# Patient Record
Sex: MALE | Race: BLACK OR AFRICAN AMERICAN | NOT HISPANIC OR LATINO | Employment: FULL TIME | URBAN - METROPOLITAN AREA
[De-identification: names, ages, dates, MRNs, and addresses within clinical notes are randomized per-mention and may not be internally consistent; named-entity substitution may affect disease eponyms.]

---

## 2017-09-16 ENCOUNTER — OFFICE VISIT (OUTPATIENT)
Dept: URGENT CARE | Facility: URGENT CARE | Age: 32
End: 2017-09-16
Payer: COMMERCIAL

## 2017-09-16 VITALS
DIASTOLIC BLOOD PRESSURE: 76 MMHG | TEMPERATURE: 98.6 F | SYSTOLIC BLOOD PRESSURE: 123 MMHG | WEIGHT: 183.5 LBS | HEART RATE: 66 BPM

## 2017-09-16 DIAGNOSIS — S39.012A BACK STRAIN, INITIAL ENCOUNTER: Primary | ICD-10-CM

## 2017-09-16 PROCEDURE — 99203 OFFICE O/P NEW LOW 30 MIN: CPT | Performed by: FAMILY MEDICINE

## 2017-09-16 RX ORDER — IBUPROFEN 600 MG/1
600 TABLET, FILM COATED ORAL EVERY 6 HOURS PRN
Qty: 90 TABLET | Refills: 1 | Status: SHIPPED | OUTPATIENT
Start: 2017-09-16 | End: 2019-09-06

## 2017-09-16 NOTE — PROGRESS NOTES
Subjective:   Reuben Cano is a 32 year old male who presents for   Chief Complaint   Patient presents with     Back Pain     lower back pain from lifting heavy weights two days ago.      Did dead lifts the other day and injured himself, attempted 300 lbs. Used to play basketball and rugby in college. Has tried advil can't recall the strength. The symptoms at this time is mainly tightness. He's done this before and it took over a week to heal. Normal bowel/bladder dysfunc. No radicular or symptoms going down to legs.     PMHX/PSHX/MEDS/ALLERGIES/SHX/FHX reviewed and updated in Epic.    There are no active problems to display for this patient.    Current Outpatient Prescriptions   Medication     ibuprofen (ADVIL/MOTRIN) 600 MG tablet     No current facility-administered medications for this visit.        ROS:  As above per HPI    Objective:   /76  Pulse 66  Temp 98.6  F (37  C) (Oral)  Wt 183 lb 8 oz (83.2 kg), There is no height or weight on file to calculate BMI.  Gen:  NAD, well-nourished, sitting in chair comfortably, very pleasant  HEENT: PERRLA; nares patent; oropharynx pink and moist  Neck: s trachea midline  BACK: good ROM in flexion and extension. There is mild discomfort midline above the pelvis in the L3/L4 region. Negative straight leg test.   NEURO: 1+ patellar bilaterally, symmetrical  ABD: non distended, soft  Skin: no obvious rashes or abnormalities  Psych: Euthymic, linear thoughts, normal rate of speech       No results found for this or any previous visit.    Assesment & Plan:   Reuben Cano, 32 year old male who presents with:  Back strain, initial encounter  Stretches, avoiding deadlifting until improved, Ibuprofen PRN. Patient declined muscle relaxants. I encouraged to also use heat packs  - ibuprofen (ADVIL/MOTRIN) 600 MG tablet  Dispense: 90 tablet; Refill: 1    Options for treatment and/or follow-up care were reviewed with the patient. Reuben Cano and/or legal guardian was  engaged and actively involved in the decision making process. Patient/guardian verbalized understanding of the options discussed and was satisfied with the final plan.      Gary Tinoco MD PGY3  [phone] (Pager)  [facility]

## 2017-09-16 NOTE — NURSING NOTE
Chief Complaint   Patient presents with     Back Pain     lower back pain from lifting heavy weights two days ago.        Initial /76  Pulse 66  Temp 98.6  F (37  C) (Oral)  Wt 183 lb 8 oz (83.2 kg) There is no height or weight on file to calculate BMI.  Medication Reconciliation: complete

## 2017-09-16 NOTE — MR AVS SNAPSHOT
"              After Visit Summary   2017    Reuben Cano    MRN: [id number]           Patient Information     Date Of Birth          1985        Visit Information        Provider Department      2017 12:40 PM Gary Tinoco MD [facility]        Today's Diagnoses     Back strain, initial encounter    -  1      Care Instructions    Try heat packs to the lower back    Ibuprofen 600mg as needed for pain     Perform back stretches daily          Follow-ups after your visit        Who to contact     If you have questions or need follow up information about today's clinic visit or your schedule please contact [facility] directly at [phone].  Normal or non-critical lab and imaging results will be communicated to you by MyChart, letter or phone within 4 business days after the clinic has received the results. If you do not hear from us within 7 days, please contact the clinic through Yesweplayhart or phone. If you have a critical or abnormal lab result, we will notify you by phone as soon as possible.  Submit refill requests through JRapid or call your pharmacy and they will forward the refill request to us. Please allow 3 business days for your refill to be completed.          Additional Information About Your Visit        MyChart Information     JRapid lets you send messages to your doctor, view your test results, renew your prescriptions, schedule appointments and more. To sign up, go to [email or web address] . Click on \"Log in\" on the left side of the screen, which will take you to the Welcome page. Then click on \"Sign up Now\" on the right side of the page.     You will be asked to enter the access code listed below, as well as some personal information. Please follow the directions to create your username and password.     Your access code is: [email or web address]  Expires: 12/15/2017  1:11 PM     Your access code will  in 90 days. If " you need help or a new code, please call your [facility] clinic or [phone].        Care EveryWhere ID     This is your Care EveryWhere ID. This could be used by other organizations to access your [facility] medical records  [id number]        Your Vitals Were     Pulse Temperature                66 98.6  F (37  C) (Oral)           Blood Pressure from Last 3 Encounters:   09/16/17 123/76    Weight from Last 3 Encounters:   09/16/17 183 lb 8 oz (83.2 kg)              Today, you had the following     No orders found for display         Today's Medication Changes          These changes are accurate as of: 9/16/17  1:11 PM.  If you have any questions, ask your nurse or doctor.               Start taking these medicines.        Dose/Directions    ibuprofen 600 MG tablet   Commonly known as:  ADVIL/MOTRIN   Used for:  Back strain, initial encounter   Started by:  Gary Tinoco MD        Dose:  600 mg   Take 1 tablet (600 mg) by mouth every 6 hours as needed for moderate pain   Quantity:  90 tablet   Refills:  1            Where to get your medicines      These medications were sent to [facility] [address] [place] [address] [address] [facility] [address]  [address], [facility] [address]    Hours:  24-hours Phone:  [phone]     ibuprofen 600 MG tablet                Primary Care Provider    None Specified       No primary provider on file.        Equal Access to Services     LC MOORE AH: Hadii jesus caponeo Sovicky, waaxda luqadaha, qaybta kaalmada nyasia, adilia page. So [facility] [place] [facility] [phone].    ATENCIÓN: Si habla español, tiene a knight disposición servicios gratuitos de asistencia lingüística. [place]lower al [phone].    We comply with applicable federal civil rights laws and [place] laws. We do not discriminate on the basis of race, color, national origin, age, disability sex, sexual orientation or gender identity.            Thank you!      Thank you for choosing [facility]  for your care. Our goal is always to provide you with excellent care. Hearing back from our patients is one way we can continue to improve our services. Please take a few minutes to complete the written survey that you may receive in the mail after your visit with us. Thank you!             Your Updated Medication List - Protect others around you: Learn how to safely use, store and throw away your medicines at www.disposemymeds.org.          This list is accurate as of: 9/16/17  1:11 PM.  Always use your most recent med list.                   Brand Name Dispense Instructions for use Diagnosis    ibuprofen 600 MG tablet    ADVIL/MOTRIN    90 tablet    Take 1 tablet (600 mg) by mouth every 6 hours as needed for moderate pain    Back strain, initial encounter

## 2017-09-16 NOTE — PATIENT INSTRUCTIONS
Try heat packs to the lower back    Ibuprofen 600mg as needed for pain     Perform back stretches daily

## 2018-12-21 ENCOUNTER — ANCILLARY PROCEDURE (OUTPATIENT)
Dept: GENERAL RADIOLOGY | Facility: CLINIC | Age: 33
End: 2018-12-21
Attending: PHYSICIAN ASSISTANT
Payer: COMMERCIAL

## 2018-12-21 ENCOUNTER — OFFICE VISIT (OUTPATIENT)
Dept: URGENT CARE | Facility: URGENT CARE | Age: 33
End: 2018-12-21
Payer: COMMERCIAL

## 2018-12-21 VITALS — OXYGEN SATURATION: 99 % | WEIGHT: 188.7 LBS | TEMPERATURE: 97.1 F | HEART RATE: 60 BPM

## 2018-12-21 DIAGNOSIS — M62.830 BACK MUSCLE SPASM: ICD-10-CM

## 2018-12-21 DIAGNOSIS — S39.92XA LOWER BACK INJURY, INITIAL ENCOUNTER: Primary | ICD-10-CM

## 2018-12-21 DIAGNOSIS — S39.92XA LOWER BACK INJURY, INITIAL ENCOUNTER: ICD-10-CM

## 2018-12-21 PROCEDURE — 72100 X-RAY EXAM L-S SPINE 2/3 VWS: CPT | Mod: FY

## 2018-12-21 PROCEDURE — 99214 OFFICE O/P EST MOD 30 MIN: CPT | Performed by: PHYSICIAN ASSISTANT

## 2018-12-21 RX ORDER — IBUPROFEN 800 MG/1
800 TABLET, FILM COATED ORAL EVERY 8 HOURS PRN
Qty: 30 TABLET | Refills: 0 | Status: SHIPPED | OUTPATIENT
Start: 2018-12-21 | End: 2019-09-06

## 2018-12-21 RX ORDER — METHOCARBAMOL 750 MG/1
750 TABLET, FILM COATED ORAL 4 TIMES DAILY PRN
Qty: 30 TABLET | Refills: 0 | Status: SHIPPED | OUTPATIENT
Start: 2018-12-21 | End: 2018-12-31

## 2018-12-24 NOTE — PROGRESS NOTES
SUBJECTIVE  HPI: Reuben Cano is a 33 year old male who presents for evaluation of back pain and spams  Symptoms began 2 day(s) ago, have been onset gradual and are worse.  Pain is located in the low back bilateral region, with radiation to does not radiate, and are at worst a 4 on a scale of 1-10.  Recent injury:turning/bending   Personal hx of back pain is recurrent self limited episodes of low back pain in the past.  Pain is exacerbated by: bending and changing position.  Pain is relieved by: heat and ice   Associated sx include: denies radicular pain  Red flag symptoms: negative, fever, chills, night sweats.    PMH  Lower back pain    FAMILY HX  HTN  Allergies  Obesity  .  No Known Allergies  Social History     Tobacco Use     Smoking status: Never Smoker     Smokeless tobacco: Never Used   Substance Use Topics     Alcohol use: Not on file       ROS:  CONSTITUTIONAL:NEGATIVE for fever, chills, change in weight  INTEGUMENTARY/SKIN: NEGATIVE for worrisome rashes, moles or lesions  ENT/MOUTH: NEGATIVE for ear, mouth and throat problems  RESP:NEGATIVE for significant cough or SOB  CV: NEGATIVE for chest pain, palpitations or peripheral edema  GI: NEGATIVE for nausea, abdominal pain, heartburn, or change in bowel habits  : positive for dysuria  MUSCULOSKELETAL: POSITIVE  for back pain bilateral with spasms  NEURO: NEGATIVE for weakness, dizziness or paresthesias    OBJECTIVE:  Pulse 60   Temp 97.1  F (36.2  C) (Tympanic)   Wt 85.6 kg (188 lb 11.2 oz)   SpO2 99%   Back examination: Back symmetric, no curvature. ROM normal. No CVA tenderness., positive findings: paraspinal muscle spasm, tenderness to palpation   STRAIGHT leg raise test: negative  GENERAL APPEARANCE: healthy, alert and no distress  NECK: supple, non-tender to palpation, no adenopathy noted  RESP: lungs clear to auscultation - no rales, rhonchi or wheezes  CV: regular rates and rhythm, normal S1 S2, no murmur noted  ABDOMEN:  soft, nontender, no  HSM or masses and bowel sounds normal  NEURO: Normal strength and tone with no weakness or sensory deficit noted, reflexes normal   Extremities: no peripheral edema or tenderness, peripheral pulses normal  MS:  tender to palpation lumbar area with spasms  SKIN: no suspicious lesions or rashes    Lumbar xray Negative for acute findings, read by Balta MARAVILLA at time of visit.    ASSESSMENT/IMPRESSION/PLAN      ICD-10-CM    1. Lower back injury, initial encounter S39.92XA XR Lumbar Spine 2/3 Views     ibuprofen (ADVIL/MOTRIN) 800 MG tablet   2. Back muscle spasm M62.830 methocarbamol (ROBAXIN) 750 MG tablet       EDUCATION      1.  Continue stretching and strengthening exercises.       2.  Continue prn heat or ice application.    Orders Placed This Encounter     XR Lumbar Spine 2/3 Views     ibuprofen (ADVIL/MOTRIN) 800 MG tablet     methocarbamol (ROBAXIN) 750 MG tablet     Advised follow up with PCP for recheck  If symptoms worsen then return to the clinic or go to the ED

## 2019-09-06 ENCOUNTER — OFFICE VISIT (OUTPATIENT)
Dept: INTERNAL MEDICINE | Facility: CLINIC | Age: 34
End: 2019-09-06
Payer: COMMERCIAL

## 2019-09-06 VITALS
DIASTOLIC BLOOD PRESSURE: 76 MMHG | WEIGHT: 179 LBS | RESPIRATION RATE: 14 BRPM | HEIGHT: 69 IN | OXYGEN SATURATION: 100 % | HEART RATE: 52 BPM | TEMPERATURE: 98.1 F | SYSTOLIC BLOOD PRESSURE: 110 MMHG | BODY MASS INDEX: 26.51 KG/M2

## 2019-09-06 DIAGNOSIS — Z00.00 ROUTINE GENERAL MEDICAL EXAMINATION AT A HEALTH CARE FACILITY: Primary | ICD-10-CM

## 2019-09-06 DIAGNOSIS — Z13.6 CARDIOVASCULAR SCREENING; LDL GOAL LESS THAN 160: ICD-10-CM

## 2019-09-06 LAB
CHOLEST SERPL-MCNC: 251 MG/DL
HDLC SERPL-MCNC: 54 MG/DL
LDLC SERPL CALC-MCNC: 174 MG/DL
NONHDLC SERPL-MCNC: 197 MG/DL
TRIGL SERPL-MCNC: 115 MG/DL

## 2019-09-06 PROCEDURE — 99395 PREV VISIT EST AGE 18-39: CPT | Performed by: INTERNAL MEDICINE

## 2019-09-06 PROCEDURE — 36415 COLL VENOUS BLD VENIPUNCTURE: CPT | Performed by: INTERNAL MEDICINE

## 2019-09-06 PROCEDURE — 80061 LIPID PANEL: CPT | Performed by: INTERNAL MEDICINE

## 2019-09-06 ASSESSMENT — MIFFLIN-ST. JEOR: SCORE: 1747.32

## 2019-09-06 NOTE — PROGRESS NOTES
SUBJECTIVE:   CC: Reuben Cano is an 34 year old male who presents for preventative health visit.     Healthy Habits:     Getting at least 3 servings of Calcium per day:  Yes    Bi-annual eye exam:  Yes    Dental care twice a year:  NO    Sleep apnea or symptoms of sleep apnea:  None    Diet:  Regular (no restrictions)    Frequency of exercise:  2-3 days/week    Duration of exercise:  Greater than 60 minutes    Taking medications regularly:  Yes    Barriers to taking medications:  None    Medication side effects:  None    PHQ-2 Total Score: 0    Additional concerns today:  No        1.  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    He was in the [employer], had many vaccinations during his training, did not remember what he received or when he received it.    Today's PHQ-2 Score:   PHQ-2 ( 1999 Pfizer) 9/6/2019   Q1: Little interest or pleasure in doing things 0   Q2: Feeling down, depressed or hopeless 0   PHQ-2 Score 0   Q1: Little interest or pleasure in doing things Not at all   Q2: Feeling down, depressed or hopeless Not at all   PHQ-2 Score 0       Abuse: Current or Past(Physical, Sexual or Emotional)- No  Do you feel safe in your environment? Yes    Social History     Tobacco Use     Smoking status: Never Smoker     Smokeless tobacco: Never Used   Substance Use Topics     Alcohol use: Not on file         Alcohol Use 9/6/2019   Prescreen: >3 drinks/day or >7 drinks/week? No       Last PSA: No results found for: PSA    Reviewed orders with patient. Reviewed health maintenance and updated orders accordingly - Yes      Reviewed and updated as needed this visit by clinical staff  Tobacco  Allergies  Meds  Problems  Med Hx  Surg Hx  Fam Hx  Soc Hx          Reviewed and updated as needed this visit by Provider  Tobacco  Allergies  Meds  Problems  Med Hx  Surg Hx  Fam Hx          Past Medical History:  ---------------------------  Past Medical History:   Diagnosis Date      NO ACTIVE PROBLEMS        Past Surgical History:  ---------------------------  Past Surgical History:   Procedure Laterality Date     NO HISTORY OF SURGERY         Current Medications:  ---------------------------  No current outpatient medications on file.       Allergies:  -------------  No Known Allergies    Social History:  -------------------  Social History     Socioeconomic History     Marital status:      Spouse name: Not on file     Number of children: Not on file     Years of education: Not on file     Highest education level: Not on file   Occupational History     Not on file   Social Needs     Financial resource strain: Not on file     Food insecurity:     Worry: Not on file     Inability: Not on file     Transportation needs:     Medical: Not on file     Non-medical: Not on file   Tobacco Use     Smoking status: Never Smoker     Smokeless tobacco: Never Used   Substance and Sexual Activity     Alcohol use: Not on file     Drug use: Not on file     Sexual activity: Not on file   Lifestyle     Physical activity:     Days per week: Not on file     Minutes per session: Not on file     Stress: Not on file   Relationships     Social connections:     Talks on phone: Not on file     Gets together: Not on file     Attends Latter day service: Not on file     Active member of club or organization: Not on file     Attends meetings of clubs or organizations: Not on file     Relationship status: Not on file     Intimate partner violence:     Fear of current or ex partner: Not on file     Emotionally abused: Not on file     Physically abused: Not on file     Forced sexual activity: Not on file   Other Topics Concern     Not on file   Social History Narrative     Not on file       Family Medical History:  ------------------------------  Family History   Problem Relation Age of Onset     No Known Problems Mother      No Known Problems Father      No Known Problems Sister      No Known Problems Sister      No  "Known Problems Sister      Coronary Artery Disease Early Onset No family hx of      Colon Cancer No family hx of         Review of Systems  CONSTITUTIONAL: NEGATIVE for fever, chills, change in weight  INTEGUMENTARY/SKIN: NEGATIVE for worrisome rashes, moles or lesions  EYES: NEGATIVE for vision changes or irritation  ENT: NEGATIVE for ear, mouth and throat problems  RESP: NEGATIVE for significant cough or SOB  CV: NEGATIVE for chest pain, palpitations or peripheral edema  GI: NEGATIVE for nausea, abdominal pain, heartburn, or change in bowel habits   male: negative for dysuria, hematuria, decreased urinary stream, erectile dysfunction, urethral discharge  MUSCULOSKELETAL: NEGATIVE for significant arthralgias or myalgia  NEURO: NEGATIVE for weakness, dizziness or paresthesias  PSYCHIATRIC: NEGATIVE for changes in mood or affect    OBJECTIVE:   /76   Pulse 52   Temp 98.1  F (36.7  C) (Oral)   Resp 14   Ht 1.753 m (5' 9\")   Wt 81.2 kg (179 lb)   SpO2 100%   BMI 26.43 kg/m      Physical Exam  GENERAL alert and no distress  EYES:  Normal sclera,conjunctiva, EOMI  HENT: oral and posterior pharynx without lesions or erythema, facies symmetric  NECK: Neck supple. No LAD, without thyroidmegaly.  RESP: Clear to ausculation bilaterally without wheezes or crackles. Normal BS in all fields.  CV: RRR normal S1S2 without murmurs, rubs or gallops.  LYMPH: no cervical lymph adenopathy appreciated  MS: extremities- no gross deformities of the visible extremities noted,   EXT:  no lower extremity edema  PSYCH: Alert and oriented times 3; speech- coherent  SKIN:  No obvious significant skin lesions on visible portions of face         ASSESSMENT/PLAN:     (Z00.00) Routine general medical examination at a health care facility  (primary encounter diagnosis)  Comment: Discussed cardiac disease risk factor modification including screening for and treating HTN, lipids, DM, and smoking cessation.  Also discussed age " "appropriate cancer screening recommendations including testicular, prostate, colon and lung cancer as dictated by age group.  Recommended low fat, low salt diet and moderation in any alcohol intake.  Recommended always using seatbelts when in a car.  Recommended never driving after drinking or riding with someone who has been drinking as well.         Patient will try to get vaccination records from his  service.  Most likely set all appropriate vaccinations.    Sign patient's form indicating he is suitable for his medical training program.    If  necessary, we can perform blood test in the future to confirm immunity against measles, mumps, chickenpox, and/or hepatitis B.  Plan:     (Z13.6) CARDIOVASCULAR SCREENING; LDL GOAL LESS THAN 160  Comment: Discussed cardiac disease risk factors and cardiac disease risk factor modification.   Plan: Lipid panel reflex to direct LDL Fasting               COUNSELING:   Reviewed preventive health counseling, as reflected in patient instructions       Regular exercise       Healthy diet/nutrition       Vision screening       Hearing screening       Safe sex practices/STD prevention    Estimated body mass index is 26.43 kg/m  as calculated from the following:    Height as of this encounter: 1.753 m (5' 9\").    Weight as of this encounter: 81.2 kg (179 lb).          reports that he has never smoked. He has never used smokeless tobacco.      Counseling Resources:  ATP IV Guidelines  Pooled Cohorts Equation Calculator  FRAX Risk Assessment  ICSI Preventive Guidelines  Dietary Guidelines for Americans, 2010  USDA's MyPlate  ASA Prophylaxis  Lung CA Screening    Thiago Grider MD  [facility]  "

## 2019-09-06 NOTE — PATIENT INSTRUCTIONS
*  Check with the  for your immunization records.       Preventive Health Recommendations  Male Ages 26 - 39    Yearly exam:             See your health care provider every year in order to  o   Review health changes.   o   Discuss preventive care.    o   Review your medicines if your doctor has prescribed any.    You should be tested each year for STDs (sexually transmitted diseases), if you re at risk.     After age 35, talk to your provider about cholesterol testing. If you are at risk for heart disease, have your cholesterol tested at least every 5 years.     If you are at risk for diabetes, you should have a diabetes test (fasting glucose).  Shots: Get a flu shot each year. Get a tetanus shot every 10 years.     Nutrition:    Eat at least 5 servings of fruits and vegetables daily.     Eat whole-grain bread, whole-wheat pasta and brown rice instead of white grains and rice.     Get adequate Calcium and Vitamin D.     Lifestyle    Exercise for at least 150 minutes a week (30 minutes a day, 5 days a week). This will help you control your weight and prevent disease.     Limit alcohol to one drink per day.     No smoking.     Wear sunscreen to prevent skin cancer.     See your dentist every six months for an exam and cleaning.

## 2019-09-06 NOTE — LETTER
9/6/2019      Reuben MENDOZA Rachael  [address]  [facility] [id number]      Dear Mr. Reuben Cano:    I am writing to inform you of the results of the laboratory tests you had done recently.    Total Cholesterol:   Lab Results   Component Value Date    CHOL 251 09/06/2019          (Recommended: below 200)  HDL (good) Cholesterol :   Lab Results   Component Value Date    HDL 54 09/06/2019         (Recommended: 40 or more)  LDL (bad) Cholesterol:    Lab Results   Component Value Date     09/06/2019          (Recommended: below 130, below 100 if heart disease or diabetes is diagnosed)  Triglycerides:    Lab Results   Component Value Date    TRIG 115 09/06/2019       (Recommended: below 180)  Non HDL cholesterol (Cholesterol ratio:   Lab Results   Component Value Date    NHDL 197 09/06/2019     (Ideally below 130, Acceptable below 160).      Your labs were all within normal limits, except your cholesterol results are mildly elevated, but not high enough to immediately consider a medication.  I would recommend paying more attention to a low fat diet (trying to keep the calories from fat less than 30% of the total calories) and exercising on a more regular basis.  If you are unable to lower the cholesterol through these means, then you may require a medication.    Please return to see me in 1-2 years to recheck these cholesterol levels.      Thank you for allowing me to participate in your care. If you have any further questions or problems, please contact me via our nurse line at [phone]    Sincerely,          Thiago Grider M.D.  Department of Internal Medicine  [facility]

## 2019-10-24 ENCOUNTER — OFFICE VISIT (OUTPATIENT)
Dept: INTERNAL MEDICINE | Facility: CLINIC | Age: 34
End: 2019-10-24
Payer: COMMERCIAL

## 2019-10-24 VITALS
OXYGEN SATURATION: 100 % | BODY MASS INDEX: 26.96 KG/M2 | HEART RATE: 58 BPM | DIASTOLIC BLOOD PRESSURE: 74 MMHG | HEIGHT: 69 IN | RESPIRATION RATE: 16 BRPM | TEMPERATURE: 98.2 F | SYSTOLIC BLOOD PRESSURE: 120 MMHG | WEIGHT: 182 LBS

## 2019-10-24 DIAGNOSIS — G43.109 MIGRAINE WITH AURA AND WITHOUT STATUS MIGRAINOSUS, NOT INTRACTABLE: Primary | ICD-10-CM

## 2019-10-24 PROCEDURE — 99214 OFFICE O/P EST MOD 30 MIN: CPT | Performed by: INTERNAL MEDICINE

## 2019-10-24 RX ORDER — VENLAFAXINE HYDROCHLORIDE 75 MG/1
75 CAPSULE, EXTENDED RELEASE ORAL DAILY
Qty: 90 CAPSULE | Refills: 3 | Status: SHIPPED | OUTPATIENT
Start: 2019-10-24 | End: 2020-03-01

## 2019-10-24 RX ORDER — SUMATRIPTAN 50 MG/1
50 TABLET, FILM COATED ORAL
Qty: 18 TABLET | Refills: 3 | Status: SHIPPED | OUTPATIENT
Start: 2019-10-24 | End: 2020-11-24

## 2019-10-24 ASSESSMENT — MIFFLIN-ST. JEOR: SCORE: 1760.93

## 2019-10-24 NOTE — PROGRESS NOTES
"  SUBJECTIVE:                                                      HPI: Reuben Cano is a pleasant 34 year old male who presents with migraines:    - ongoing chronically - since he was a teenager  - occur ~2x/week  - last several hours at least - sometimes up to 6-8 hours  - frontal in location  - severe in severity  - throbbing in quality  - preceding visual aura  - associated nausea, no vomiting  - associated photo- and phono-phobia  - no improvement with OTC analgesics  - gets better if he goes to sleep  - triggers include lack of sleep, stress, and pistachios    Was treated with daily propranolol in the past but stopped after several months. Worked well as migraine prophylaxis but he didn't like the way it made him feel. Has never tried an abortive Rx.     No other active medical problems.    Patient needs [employer] paperwork completed for work. 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     The medication, allergy, and problem lists have been reviewed and updated as appropriate.     OBJECTIVE:                                                      /74   Pulse 58   Temp 98.2  F (36.8  C) (Oral)   Resp 16   Ht 1.753 m (5' 9\")   Wt 82.6 kg (182 lb)   SpO2 100%   BMI 26.88 kg/m    Constitutional: well-appearing  Psych: normal judgment and insight; normal mood and affect; recent and remote memory intact    ASSESSMENT/PLAN:                                                      (G43.109) Migraine with aura and without status migrainosus, not intractable  (primary encounter diagnosis)  Comment: occurring frequently; currently untreated; did not tolerate propranolol.   Plan:    - TRIAL of daily Effexor XR 75mg for migraine prophylaxis.   - TRIAL of Imitrex at the very onset of aura or migraine.   - CONSIDER daily riboflavin, magnesium, and CoQ10 supplements - may help, won't hurt.   - follow-up with me or PCP in 2-3 months (earlier as needed).   - work and union paperwork completed today.     The " instructions on the AVS were discussed and explained to the patient. Patient expressed understanding of instructions.    (Chart documentation was completed, in part, with [employer] voice-recognition software. Even though reviewed, some grammatical, spelling, and word errors may remain.)    Roseanna Angela MD   [address] [address]  T: [phone], F: [phone]

## 2019-10-24 NOTE — PATIENT INSTRUCTIONS
START DAILY Effexor 75mg for migraine prevention.    Imitrex as soon as you know you are about to have (or are having) a migraine. May take a second dose 30 minutes later if continued symptoms.    ---    Stay well-hydrated.  Sleep well and consistently.    ---    May benefit from adding a daily riboflavin and CoQ10 and magnesium supplements.

## 2020-11-24 ENCOUNTER — OFFICE VISIT (OUTPATIENT)
Dept: INTERNAL MEDICINE | Facility: CLINIC | Age: 35
End: 2020-11-24
Payer: COMMERCIAL

## 2020-11-24 VITALS
SYSTOLIC BLOOD PRESSURE: 115 MMHG | BODY MASS INDEX: 27.64 KG/M2 | DIASTOLIC BLOOD PRESSURE: 60 MMHG | RESPIRATION RATE: 16 BRPM | TEMPERATURE: 97.3 F | WEIGHT: 187.2 LBS | HEART RATE: 60 BPM | OXYGEN SATURATION: 100 %

## 2020-11-24 DIAGNOSIS — G43.109 MIGRAINE WITH AURA AND WITHOUT STATUS MIGRAINOSUS, NOT INTRACTABLE: Primary | ICD-10-CM

## 2020-11-24 PROCEDURE — 99214 OFFICE O/P EST MOD 30 MIN: CPT | Performed by: INTERNAL MEDICINE

## 2020-11-24 RX ORDER — SUMATRIPTAN 50 MG/1
50 TABLET, FILM COATED ORAL
Qty: 18 TABLET | Refills: 3 | Status: SHIPPED | OUTPATIENT
Start: 2020-11-24 | End: 2021-12-23

## 2020-11-24 RX ORDER — ONDANSETRON 4 MG/1
4 TABLET, ORALLY DISINTEGRATING ORAL EVERY 8 HOURS PRN
Qty: 30 TABLET | Refills: 0 | Status: SHIPPED | OUTPATIENT
Start: 2020-11-24 | End: 2022-08-04

## 2020-11-24 RX ORDER — TOPIRAMATE 25 MG/1
TABLET, FILM COATED ORAL
Qty: 60 TABLET | Refills: 0 | Status: SHIPPED | OUTPATIENT
Start: 2020-11-24 | End: 2020-12-31

## 2020-11-24 NOTE — PATIENT INSTRUCTIONS
*  Because you are having such frequent migraine, take a preventative medication every day to help prevent the migraines from happening in the first place.      --Topamax (topiramte) 25 mg once per day for 2 weeks, then 50 mg (2 x 25 mg tablets) once per day.  Perhaps take at bedtime which can also help sleeping.  The dose can be increased further if needed.        *  Take Imitrex 50 or 100 mg at the onset of migraine specific headache, or even at the time of the aura that precedes the migraine if you have a specific aura that precedes your migraine headache.  You may take a second or third dose every 2-4 hours if you do not get noticeable migraine relief up to a maximum dose of 200 mg per day.      *  Take the Imitrex at the onset of the migraine aura and you may not even get the migraine headache.    *  Possible side effects of Imitrex include, but are not limited to: chest pain, palpitations, shortness of breath, chest tightness, anxiety, sweating, dizziness.   Stop the medication if you develop any concerning side effects.      *  Try to find a quiet, darker room to rest during the migraine headache.  Cover your eyes with a washcloth if needed.      *  You may experience nausea and/or vomiting during the migraines.  Try to remain as well hydrated as you can and eat what you are capable.     *  It is typical to have lingering migraine symptoms for a day or two after a migraine which may also include fatigue and difficulties in concentration.      *  There is also a possibility of another rebound migraine during the days following a migraine episode.  Use the migraine medication mentioned above.       *  Follow up appointment with me in one month regardless of how you feel via a virtual telephone visit appointment.        Common Migraine triggers:    * sudden changes in the weather, excessive heat and humidity  * Some fragrances common to outdoor living, including sunscreens and insect repellents.  * Foods  containing nitrites, such as hot dogs, sausages, luncheon meats  *  Certain cheeses such as Cheddar, Brie, Blue cheeses.    Some cheeses are OK such as cottage cheeses, American cheese, and cream cheese.  *  Caffeine and alcohol can be triggers, limit iced tea, coffee and cola drinks to 16 ounces per day and alcoholic drinks to 1-2 per day.  *  Avoid choclate and pizza.  *  Have no more than 1/2 cup per day of citrus, raisins, papayas or avocados

## 2020-11-24 NOTE — PROGRESS NOTES
Subjective     Reuben Cano is a 35 year old male who presents to clinic today for the following health issues:    HPI         Pt is here to discuss [place] the reason being is for migraines. Pt misses work due to migraines so work has advised him to [place] the [place]ences due to the migraines.       Migraines regularly for many years since age 13.  Gets severe migraine on average 3-4 per months, each episode lasts 1-2 days.   Episodes most of the time have some aura, usually visual disturabnce.   Triggers include coffee, tea, pistachios, alcohol, powerful scents, chocolate.   Migraine headaches are severe one sided headaches, debilitating, he cannot do anything when they happened. imitrex somewhat helpful, but he does not take additional doses.     Does not remember any attempts at prophylactic medications.    I reviewed Epic and saw propranolol RX'd one time in 2015, but no mention of any further attempts of follow up on that medication.   He does not remember this, does not remember if it helped or not, nor any side effects.          **I reviewed the information recorded in the patient's EPIC chart (including but not limited to medical history, surgical history, family history, problem list, medication list, and allergy list) and updated the information as indicated based on the patients reported information.         Review of Systems   Constitutional, HEENT, cardiovascular, pulmonary, GI, , musculoskeletal, neuro, skin, endocrine and psych systems are negative, except as otherwise noted.      Objective    /60 (BP Location: Left arm, Patient Position: Chair, Cuff Size: Adult Large)   Pulse 60   Temp 97.3  F (36.3  C) (Temporal)   Resp 16   Wt 84.9 kg (187 lb 3.2 oz)   SpO2 100%   BMI 27.64 kg/m    Body mass index is 27.64 kg/m .  Physical Exam   GENERAL alert and no distress  EYES:  Normal sclera,conjunctiva, EOMI  HENT: oral and posterior pharynx without lesions or  erythema, facies symmetric  NECK: Neck supple. No LAD, without thyroidmegaly.  RESP: Clear to ausculation bilaterally without wheezes or crackles. Normal BS in all fields.  CV: RRR normal S1S2 without murmurs, rubs or gallops.  LYMPH: no cervical lymph adenopathy appreciated  MS: extremities- no gross deformities of the visible extremities noted,   EXT:  no lower extremity edema  PSYCH: Alert and oriented times 3; speech- coherent  SKIN:  No obvious significant skin lesions on visible portions of face               (G43.109) Migraine with aura and without status migrainosus, not intractable  (primary encounter diagnosis)  Comment:   *  Long standing history of migriane.   I only saw one instance of trying a prophylactic medication 5 years ago with propranolol.  The patient does not remember if this helped, or any side effects     *  No other reported attempts at prevention of migraines.     *  Because you are having such frequent migraine, take a preventative medication every day to help prevent the migraines from happening in the first place.      --Topamax (topiramte) 25 mg once per day for 2 weeks, then 50 mg (2 x 25 mg tablets) once per day.  Perhaps take at bedtime which can also help sleeping.  The dose can be increased further if needed.      *  Definiteily would refer to Neurology Clinic for discussion of one of the newer migraine preventative medications (Emgality) if he does not get these under better control.     *  Take Imitrex 50 or 100 mg at the onset of migraine specific headache, or even at the time of the aura that precedes the migraine if you have a specific aura that precedes your migraine headache.  You may take a second or third dose every 2-4 hours if you do not get noticeable migraine relief up to a maximum dose of 200 mg per day.      *  Take the Imitrex at the onset of the migraine aura and you may not even get the migraine headache.    *  Possible side effects of Imitrex include, but are  not limited to: chest pain, palpitations, shortness of breath, chest tightness, anxiety, sweating, dizziness.   Stop the medication if you develop any concerning side effects.      *  Try to find a quiet, darker room to rest during the migraine headache.  Cover your eyes with a washcloth if needed.      *  You may experience nausea and/or vomiting during the migraines.  Try to remain as well hydrated as you can and eat what you are capable.     *  It is typical to have lingering migraine symptoms for a day or two after a migraine which may also include fatigue and difficulties in concentration.      *  There is also a possibility of another rebound migraine during the days following a migraine episode.  Use the migraine medication mentioned above.    *  Follow up appointment with me in one month regardless of how you feel via a virtual telephone visit appointment.        *  Completed FMLA form indicating his history of migraine headaches.     Plan: SUMAtriptan (IMITREX) 50 MG tablet, topiramate         (TOPAMAX) 25 MG tablet, ondansetron         (ZOFRAN-ODT) 4 MG ODT tab             I spent greater than 25 minutes with pt, greater than 50% of time was educational and counseling.

## 2020-12-31 DIAGNOSIS — G43.109 MIGRAINE WITH AURA AND WITHOUT STATUS MIGRAINOSUS, NOT INTRACTABLE: ICD-10-CM

## 2020-12-31 RX ORDER — TOPIRAMATE 50 MG/1
50 TABLET, FILM COATED ORAL DAILY
Qty: 30 TABLET | Refills: 0 | Status: SHIPPED | OUTPATIENT
Start: 2020-12-31 | End: 2021-01-04

## 2021-01-04 ENCOUNTER — VIRTUAL VISIT (OUTPATIENT)
Dept: INTERNAL MEDICINE | Facility: CLINIC | Age: 36
End: 2021-01-04
Payer: COMMERCIAL

## 2021-01-04 DIAGNOSIS — G43.109 MIGRAINE WITH AURA AND WITHOUT STATUS MIGRAINOSUS, NOT INTRACTABLE: Primary | ICD-10-CM

## 2021-01-04 DIAGNOSIS — Z13.6 CARDIOVASCULAR SCREENING; LDL GOAL LESS THAN 160: ICD-10-CM

## 2021-01-04 DIAGNOSIS — E78.5 HYPERLIPIDEMIA LDL GOAL <130: ICD-10-CM

## 2021-01-04 PROCEDURE — 99213 OFFICE O/P EST LOW 20 MIN: CPT | Mod: 95 | Performed by: INTERNAL MEDICINE

## 2021-01-04 RX ORDER — TOPIRAMATE 50 MG/1
50 TABLET, FILM COATED ORAL DAILY
Qty: 90 TABLET | Refills: 3 | Status: SHIPPED | OUTPATIENT
Start: 2021-01-04 | End: 2021-01-15

## 2021-01-04 NOTE — PROGRESS NOTES
Reuben Cano is a 35 year old male who is being evaluated via a billable telephone visit.      What phone number would you like to be contacted at? [phone]  How would you like to obtain your AVS? Mail a copy  Assessment & Plan     Migraine with aura and without status migrainosus, not intractable    - topiramate (TOPAMAX) 50 MG tablet  Dispense: 90 tablet; Refill: 3        (G43.109) Migraine with aura and without status migrainosus, not intractable  (primary encounter diagnosis)  Comment: doing much better since starting topiramte.   He has not experienced any significant side effects of this medication.   conitnue takign once daily.   Use imitrex as needed.   Plan: topiramate (TOPAMAX) 50 MG tablet            (E78.5) Hyperlipidemia LDL goal <130  Comment: needs repeat labs, no medicaiotn indicated yet, but should be rechecked.   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    Thiago Grider MD  [facility]     Reuben Cano is a 35 year old male who presents to clinic today for the following health issues     HPI       Migraine     Since your last clinic visit, how have your headaches changed?  Improved    How often are you getting headaches or migraines? Headaches once every couple of weeks     Are you able to do normal daily activities when you have a migraine? Yes    Are you taking rescue/relief medications?  (Select all that apply) sumatriptan (Imitrex)    How helpful is your rescue/relief medication?  I get total relief    Are you taking any medications to prevent migraines? (Select all that apply)  Topomax    In the past 4 weeks, how often have you gone to urgent care or the emergency room because of your headaches?  0      How many servings of fruits and vegetables do you eat daily?  0-1    On average, how many sweetened beverages do you drink each day (Examples: soda, juice, sweet tea, etc.  Do NOT count diet or artificially sweetened beverages)?   0    How many days per week do you exercise enough to make your heart beat faster? 3 or less    How many minutes a day do you exercise enough to make your heart beat faster? 30 - 60    How many days per week do you miss taking your medication? 0    Since startting topmaamx, has only had one migraine, and used imitrex only once  Was having sever hedaches and probable migraines 3-4 times per month.     He has not experienced any significant side effects of this medication.     2.  Prior lipids elevated  Recent Labs   Lab Test 09/06/19  0928   CHOL 251*   HDL 54   *   TRIG 115        **I reviewed the information recorded in the patient's EPIC chart (including but not limited to medical history, surgical history, family history, problem list, medication list, and allergy list) and updated the information as indicated based on the patients reported information.         Review of Systems   Constitutional, HEENT, cardiovascular, pulmonary, gi and gu systems are negative, except as otherwise noted.      Objective           Vitals:  No vitals were obtained today due to virtual visit.    Physical Exam   healthy, alert and no distress  PSYCH: Alert and oriented times 3; coherent speech, normal   rate and volume, able to articulate logical thoughts, able   to abstract reason, no tangential thoughts, no hallucinations   or delusions  His affect is normal  RESP: No cough, no  audible wheezing, able to talk in full sentences  Remainder of exam unable to be completed due to telephone visits                Phone call duration: 14:45 minutes

## 2021-01-04 NOTE — PATIENT INSTRUCTIONS
"*  Continue all medications at the same doses.  Contact your usual pharmacy if you need refills.     *  Look into a \"mail order\" pharmacy which will allow you to get 3 months of medications delivered to your house.   Contact your insurance provider to find out what options for mail order pharmacies.   Some common mail oredr pharmacies are [facility], [facility], [facility], [facility], etc.    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    I would like to recheck your previosuly elevated cholesterol levels.   Please get fasting labs done at the [facility] or any other [facility] lab 1-2 days before this appointment (schedule a \"lab appointment\").  If you get the labs done at another clinic, make arrangements with them directly.  The orders will be in place.  Eat nothing for at least 8 hours prior to having these labs drawn.  Use [employer] or Call [phone] to schedule the appointment with me and lab appointment.         "

## 2021-01-14 DIAGNOSIS — G43.109 MIGRAINE WITH AURA AND WITHOUT STATUS MIGRAINOSUS, NOT INTRACTABLE: ICD-10-CM

## 2021-01-15 RX ORDER — TOPIRAMATE 50 MG/1
50 TABLET, FILM COATED ORAL DAILY
Qty: 90 TABLET | Refills: 3 | Status: SHIPPED | OUTPATIENT
Start: 2021-01-15 | End: 2021-12-23

## 2021-02-06 ENCOUNTER — E-VISIT (OUTPATIENT)
Dept: INTERNAL MEDICINE | Facility: CLINIC | Age: 36
End: 2021-02-06
Payer: COMMERCIAL

## 2021-02-06 DIAGNOSIS — Z20.822 SUSPECTED COVID-19 VIRUS INFECTION: ICD-10-CM

## 2021-02-06 DIAGNOSIS — J06.9 VIRAL URI: Primary | ICD-10-CM

## 2021-02-06 PROCEDURE — 99421 OL DIG E/M SVC 5-10 MIN: CPT | Performed by: INTERNAL MEDICINE

## 2021-02-06 NOTE — LETTER
February 7, 2021      Reuben Cano  [id number]  [place] [id number]        To whom it may concern:    Reuben Cano sought care today for acute upper respiratory symptoms suspicious for possible Covid 19 infection.  Covid-19 testing has been ordered and should hopefully be completed within the next 24 hours.  They should quarantine until the Covid testing results return.   If the Covid-19 test is positive, they may return to work once they have met all of these criteria: at least 10 days from the start of symptoms, no fever for at least 3 days without the help of fever reducing medicines, and clearly improving symptoms (but not necessarily completely back to normal).    If the Covid-19 test is negative, they may return to work once fever free for 24 hours without the help of fever reducing medications and improving symptoms.      If you have any questions or concerns, please call the clinic at the number listed above.     Sincerely,     Thiago Grider M.D.  Dept. of Internal Medicine  [facility]       cc: [email or web address]

## 2021-02-07 NOTE — TELEPHONE ENCOUNTER
Provider E-Visit time total (minutes): 6:30 minutes    possible covid infection, testing orders.   See mychart notes for details  Note for work also written in case needed.

## 2021-03-06 ENCOUNTER — HEALTH MAINTENANCE LETTER (OUTPATIENT)
Age: 36
End: 2021-03-06

## 2021-10-09 ENCOUNTER — HEALTH MAINTENANCE LETTER (OUTPATIENT)
Age: 36
End: 2021-10-09

## 2021-11-02 ENCOUNTER — OFFICE VISIT (OUTPATIENT)
Dept: INTERNAL MEDICINE | Facility: CLINIC | Age: 36
End: 2021-11-02
Payer: COMMERCIAL

## 2021-11-02 VITALS
SYSTOLIC BLOOD PRESSURE: 112 MMHG | OXYGEN SATURATION: 100 % | HEIGHT: 69 IN | WEIGHT: 201.8 LBS | BODY MASS INDEX: 29.89 KG/M2 | HEART RATE: 70 BPM | DIASTOLIC BLOOD PRESSURE: 66 MMHG | TEMPERATURE: 97.6 F

## 2021-11-02 DIAGNOSIS — Z13.1 ENCOUNTER FOR SCREENING FOR DIABETES MELLITUS: ICD-10-CM

## 2021-11-02 DIAGNOSIS — Z00.00 ROUTINE GENERAL MEDICAL EXAMINATION AT A HEALTH CARE FACILITY: Primary | ICD-10-CM

## 2021-11-02 DIAGNOSIS — E78.5 HYPERLIPIDEMIA LDL GOAL <130: ICD-10-CM

## 2021-11-02 PROCEDURE — 80061 LIPID PANEL: CPT | Performed by: INTERNAL MEDICINE

## 2021-11-02 PROCEDURE — 99395 PREV VISIT EST AGE 18-39: CPT | Performed by: INTERNAL MEDICINE

## 2021-11-02 PROCEDURE — 36415 COLL VENOUS BLD VENIPUNCTURE: CPT | Performed by: INTERNAL MEDICINE

## 2021-11-02 PROCEDURE — 80053 COMPREHEN METABOLIC PANEL: CPT | Performed by: INTERNAL MEDICINE

## 2021-11-02 ASSESSMENT — MIFFLIN-ST. JEOR: SCORE: 1840.74

## 2021-11-02 NOTE — PROGRESS NOTES
SUBJECTIVE:   CC: Reuben Cano is an 36 year old male who presents for preventative health visit.     Patient has been advised of split billing requirements and indicates understanding: Yes     Healthy Habits:    Getting at least 3 servings of Calcium per day:  Yes    Bi-annual eye exam:  NO    Dental care twice a year:  Yes    Diet:  Regular (no restrictions)    Frequency of exercise:  2-3 days/week    Duration of exercise:  15-30 minutes    Taking medications regularly:  Yes    Barriers to taking medications:  None    Medication side effects:  None    PHQ-2 Total Score:    Additional concerns today:  No    Reuben presents today for a physical exam. He has no acute complaints.    Today's PHQ-2 Score:   PHQ-2 ( 1999 Pfizer) 9/6/2019   Q1: Little interest or pleasure in doing things 0   Q2: Feeling down, depressed or hopeless 0   PHQ-2 Score 0   Q1: Little interest or pleasure in doing things Not at all   Q2: Feeling down, depressed or hopeless Not at all   PHQ-2 Score 0     Abuse: Current or Past(Physical, Sexual or Emotional)- No  Do you feel safe in your environment? Yes    Social History     Tobacco Use     Smoking status: Never Smoker     Smokeless tobacco: Never Used   Substance Use Topics     Alcohol use: Yes     Comment: occasionally-2x month     If you drink alcohol do you typically have >3 drinks per day or >7 drinks per week? No    Alcohol Use 11/2/2021   Prescreen: >3 drinks/day or >7 drinks/week? -   Prescreen: >3 drinks/day or >7 drinks/week? No     Reviewed orders with patient. Reviewed health maintenance and updated orders accordingly - Yes    Labs reviewed in EPIC    Reviewed and updated as needed this visit by clinical staff  Tobacco  Allergies  Meds  Problems  Med Hx  Surg Hx  Fam Hx  Soc Hx        Reviewed and updated as needed this visit by Provider  Tobacco  Allergies  Meds  Problems  Med Hx  Surg Hx  Fam Hx           Review of Systems  10pt ROS reviewed and all other systems  "negative except as stated above.    OBJECTIVE:   /66   Pulse 70   Temp 97.6  F (36.4  C) (Tympanic)   Ht 1.753 m (5' 9\")   Wt 91.5 kg (201 lb 12.8 oz)   SpO2 100%   BMI 29.80 kg/m      Physical Exam  GENERAL: In no distress.  EYES: Conjunctivae/corneas clear. EOMs grossly intact. PERRL.  HENT: NC/AT, facies symmetric. Neck supple. No LAD or thyromegaly noted.  RESP: CTAB. No w/r/r.  CV: RRR, no m/r/g.  GI: NT, ND, without rebound or guarding, no CVA tenderness, no hepatomegaly appreciated.  MSK: Moves all four extremities freely.  SKIN: No significant ulcers, lesions or rashes on the visualized portions of the skin  NEURO: Alert. Oriented.  PSYCH: Linear thought process. Speech normal rate and volume. No tangential thoughts, hallucinations, or delusions.    Diagnostic Test Results: Labs reviewed in Epic    ASSESSMENT/PLAN:   Routine general medical examination at a health care facility  Reviewed PMH. Discussed healthcare maintenance issues, including cancer screenings (not due), relevant immunizations (UTD), and cardiac risk factor screenings such as for cholesterol, HTN, and DM.    Hyperlipidemia LDL goal <130  - Lipid panel reflex to direct LDL Non-fasting; Future    Encounter for screening for diabetes mellitus  - Comprehensive metabolic panel; Future    Patient has been advised of split billing requirements and indicates understanding: Yes     COUNSELING:   Special attention given to:        Regular exercise       Healthy diet/nutrition    Estimated body mass index is 29.8 kg/m  as calculated from the following:    Height as of this encounter: 1.753 m (5' 9\").    Weight as of this encounter: 91.5 kg (201 lb 12.8 oz).     He reports that he has never smoked. He has never used smokeless tobacco.    Derrick Pagan MD  [facility]  "

## 2021-11-02 NOTE — PATIENT INSTRUCTIONS
- I will send you a message on [employer] when I am able to look at the results of your tests from today

## 2021-11-03 LAB
ALBUMIN SERPL-MCNC: 3.6 G/DL (ref 3.4–5)
ALP SERPL-CCNC: 70 U/L (ref 40–150)
ALT SERPL W P-5'-P-CCNC: 21 U/L (ref 0–70)
ANION GAP SERPL CALCULATED.3IONS-SCNC: 7 MMOL/L (ref 3–14)
AST SERPL W P-5'-P-CCNC: 15 U/L (ref 0–45)
BILIRUB SERPL-MCNC: 0.6 MG/DL (ref 0.2–1.3)
BUN SERPL-MCNC: 6 MG/DL (ref 7–30)
CALCIUM SERPL-MCNC: 8.6 MG/DL (ref 8.5–10.1)
CHLORIDE BLD-SCNC: 106 MMOL/L (ref 94–109)
CHOLEST SERPL-MCNC: 249 MG/DL
CO2 SERPL-SCNC: 26 MMOL/L (ref 20–32)
CREAT SERPL-MCNC: 1.01 MG/DL (ref 0.66–1.25)
FASTING STATUS PATIENT QL REPORTED: NO
GFR SERPL CREATININE-BSD FRML MDRD: >90 ML/MIN/1.73M2
GLUCOSE BLD-MCNC: 99 MG/DL (ref 70–99)
HDLC SERPL-MCNC: 44 MG/DL
LDLC SERPL CALC-MCNC: 173 MG/DL
NONHDLC SERPL-MCNC: 205 MG/DL
POTASSIUM BLD-SCNC: 3.9 MMOL/L (ref 3.4–5.3)
PROT SERPL-MCNC: 7.3 G/DL (ref 6.8–8.8)
SODIUM SERPL-SCNC: 139 MMOL/L (ref 133–144)
TRIGL SERPL-MCNC: 160 MG/DL

## 2021-12-23 ENCOUNTER — OFFICE VISIT (OUTPATIENT)
Dept: INTERNAL MEDICINE | Facility: CLINIC | Age: 36
End: 2021-12-23
Payer: COMMERCIAL

## 2021-12-23 VITALS
HEART RATE: 65 BPM | BODY MASS INDEX: 29.31 KG/M2 | OXYGEN SATURATION: 100 % | HEIGHT: 69 IN | TEMPERATURE: 97.4 F | WEIGHT: 197.9 LBS | SYSTOLIC BLOOD PRESSURE: 106 MMHG | DIASTOLIC BLOOD PRESSURE: 76 MMHG

## 2021-12-23 DIAGNOSIS — G43.109 MIGRAINE WITH AURA AND WITHOUT STATUS MIGRAINOSUS, NOT INTRACTABLE: ICD-10-CM

## 2021-12-23 PROCEDURE — 99214 OFFICE O/P EST MOD 30 MIN: CPT | Performed by: INTERNAL MEDICINE

## 2021-12-23 RX ORDER — SUMATRIPTAN 50 MG/1
50-100 TABLET, FILM COATED ORAL
Qty: 18 TABLET | Refills: 3 | Status: SHIPPED | OUTPATIENT
Start: 2021-12-23

## 2021-12-23 RX ORDER — TOPIRAMATE 50 MG/1
TABLET, FILM COATED ORAL
Qty: 180 TABLET | Refills: 3 | Status: SHIPPED | OUTPATIENT
Start: 2021-12-23 | End: 2022-08-04

## 2021-12-23 ASSESSMENT — MIFFLIN-ST. JEOR: SCORE: 1823.05

## 2021-12-23 NOTE — PROGRESS NOTES
"  Assessment & Plan     Migraine with aura and without status migrainosus, not intractable    - topiramate (TOPAMAX) 50 MG tablet; Take 1.5 tablets (75 mg) by mouth daily for 14 days, THEN 2 tablets (100 mg) daily for 14 days.  - SUMAtriptan (IMITREX) 50 MG tablet; Take 1-2 tablets ( mg) by mouth at onset of headache for migraine May repeat another dose every 3 hours up to max of 4 tablets/24 hours.    Ordering of each unique test  Prescription drug management         BMI:   Estimated body mass index is 29.22 kg/m  as calculated from the following:    Height as of this encounter: 1.753 m (5' 9\").    Weight as of this encounter: 89.8 kg (197 lb 14.4 oz).       See Patient Instructions    No follow-ups on file.    Sesar Martinez MD  [facility]    Silvia Bhaktatus is a 36 year old who presents for the following health issues:    HPI     Migraine     Since your last clinic visit, how have your headaches changed?  No change    How often are you getting headaches or migraines? 2 x month     Are you able to do normal daily activities when you have a migraine? No    Are you taking rescue/relief medications? (Select all that apply) sumatriptan (Imitrex)    How helpful is your rescue/relief medication?  I get total relief    Are you taking any medications to prevent migraines? (Select all that apply)  Other: topiramate    In the past 4 weeks, how often have you gone to urgent care or the emergency room because of your headaches?  0            Review of Systems   Constitutional, HEENT, cardiovascular, pulmonary, gi and gu systems are negative, except as otherwise noted.      Objective    /76   Pulse 65   Temp 97.4  F (36.3  C) (Temporal)   Ht 1.753 m (5' 9\")   Wt 89.8 kg (197 lb 14.4 oz)   SpO2 100%   BMI 29.22 kg/m    Body mass index is 29.22 kg/m .  Physical Exam   GENERAL: healthy, alert and no distress                "

## 2021-12-23 NOTE — PATIENT INSTRUCTIONS
Patient Education     Preventing Migraine Headaches: Medicines and Lifestyle Changes      Going to bed and getting up at the same time each day, including weekends, may help prevent migraines.   A migraine is a type of severe headache. Having a migraine can be very painful. But there are steps you can take to help prevent migraines.   Medicines to help prevent migraines     Your healthcare provider may prescribe certain medicines to help prevent migraines. These medicines may need to be taken daily. Or they may only need to be taken at times when you re likely to have a migraine.    Common medicines used to help prevent migraines include:  ? Triptans (serotonin receptor agonists)  ? Nonsteroidal anti-inflammatory drugs (such as ibuprofen, available over-the-counter)  ? Beta-blockers  ? Anticonvulsants  ? Tricyclic antidepressants  ? Calcium channel blockers  ? Certain vitamins, minerals, and plant extracts  ? Botulinum toxin injection for certain chronic migraines   ? CGRP (calcitonin gene-related peptide) agnonists are being reviewed by the Food and Drug Administration (FDA)    Lifestyle changes for long-term prevention   Here are some suggestions:    Exercise. Regular exercise can help prevent migraines and improve your health. (If exercise triggers your migraines, talk to your healthcare provider.)    Keep regular habits. Don t skip or delay meals. Drink plenty of water. And go to bed and get up at about the same time each day. This includes weekends.    Try alternative treatments. These are treatments that don't involve the use of medicines or surgery. They may help relieve symptoms and prevent migraines. Some treatment options include biofeedback and acupuncture. Ask your healthcare provider to tell you more about these treatments if you have questions.    Limit caffeine. You may find that caffeine helps relieve pain during an attack. But too much caffeine can also trigger migraines. So, limit the amount of  caffeine you consume.  [employer] last reviewed this educational content on 5/1/2018 2000-2021 [employer]. All rights reserved. This information is not intended as a substitute for professional medical care. Always follow your healthcare professional's instructions.

## 2022-01-28 NOTE — RESULT ENCOUNTER NOTE
Reviewed, [employer] message sent PST labs along with medical clearance on chart. Ucg obtained today in PST. Pt instructed to stop any NSAIDS/Herbal Supplements between now and procedure. No medications needed morning of procedure (Pt notes she usually taker her MS medications with some milk or food so she will HOLD Am dose until after procedure). Pre-op instructions given to pt with understanding verbalized. Pt has appointment for her COVID swab scheduled on 2/4/2022  @ 3:40 at [facility]. pt verbalizes understanding of all instructions discussed today in PST. All questions addressed with pt prior to her leaving the PST department.

## 2022-07-08 ENCOUNTER — OFFICE VISIT (OUTPATIENT)
Dept: URGENT CARE | Facility: URGENT CARE | Age: 37
End: 2022-07-08
Payer: COMMERCIAL

## 2022-07-08 VITALS
WEIGHT: 190 LBS | TEMPERATURE: 97.9 F | SYSTOLIC BLOOD PRESSURE: 114 MMHG | OXYGEN SATURATION: 100 % | RESPIRATION RATE: 16 BRPM | HEART RATE: 63 BPM | DIASTOLIC BLOOD PRESSURE: 78 MMHG | BODY MASS INDEX: 28.06 KG/M2

## 2022-07-08 DIAGNOSIS — R07.0 THROAT PAIN: Primary | ICD-10-CM

## 2022-07-08 DIAGNOSIS — J03.90 TONSILLITIS: ICD-10-CM

## 2022-07-08 LAB
DEPRECATED S PYO AG THROAT QL EIA: NEGATIVE
GROUP A STREP BY PCR: NOT DETECTED
MONOCYTES NFR BLD AUTO: NEGATIVE %

## 2022-07-08 PROCEDURE — 86308 HETEROPHILE ANTIBODY SCREEN: CPT | Performed by: PHYSICIAN ASSISTANT

## 2022-07-08 PROCEDURE — 36415 COLL VENOUS BLD VENIPUNCTURE: CPT | Performed by: PHYSICIAN ASSISTANT

## 2022-07-08 PROCEDURE — 99213 OFFICE O/P EST LOW 20 MIN: CPT | Performed by: PHYSICIAN ASSISTANT

## 2022-07-08 PROCEDURE — 87651 STREP A DNA AMP PROBE: CPT | Performed by: PHYSICIAN ASSISTANT

## 2022-07-08 NOTE — PATIENT INSTRUCTIONS
Results for orders placed or performed in visit on 07/08/22   Mononucleosis screen     Status: Normal   Result Value Ref Range    Mononucleosis Screen Negative Negative   Streptococcus A Rapid Screen w/Reflex to PCR     Status: Normal    Specimen: Throat; Swab   Result Value Ref Range    Group A Strep antigen Negative Negative

## 2022-07-08 NOTE — PROGRESS NOTES
SUBJECTIVE:   Reuben Cano is a 37 year old male who complains of moderate sore throat, pain while swallowing, white spots in throat and enlarged tonsils for 2 days. He denies a history of dry cough, chills and shortness of breath and denies a history of asthma. Patient denies smoke cigarettes.     OBJECTIVE:    /78   Pulse 63   Temp 97.9  F (36.6  C) (Tympanic)   Resp 16   Wt 86.2 kg (190 lb)   SpO2 100%   BMI 28.06 kg/m      He appears well, vital signs are as noted by the nurse. Ears normal.  Throat and pharynx erythema with bilateral exudates  Neck supple. No adenopathy in the neck. . The chest is clear, without wheezes or rales. CVS exam: S1, S2 normal, no murmur, click, rub or gallop, regular rate and rhythm.     Results for orders placed or performed in visit on 07/08/22   Mononucleosis screen     Status: Normal   Result Value Ref Range    Mononucleosis Screen Negative Negative   Streptococcus A Rapid Screen w/Reflex to PCR     Status: Normal    Specimen: Throat; Swab   Result Value Ref Range    Group A Strep antigen Negative Negative         ASSESSMENT:    Diagnosis Comments   1. Throat pain  Streptococcus A Rapid Screen w/Reflex to PCR, Group A Streptococcus PCR Throat Swab, Mononucleosis screen, amoxicillin-clavulanate (AUGMENTIN) 875-125 MG tablet    2. Tonsillitis  amoxicillin-clavulanate (AUGMENTIN) 875-125 MG tablet          PLAN:  Symptomatic therapy suggested: gargle warm salt water. Call or return to clinic prn if these symptoms worsen or fail to improve as anticipated.

## 2022-08-04 ENCOUNTER — OFFICE VISIT (OUTPATIENT)
Dept: URGENT CARE | Facility: URGENT CARE | Age: 37
End: 2022-08-04
Payer: COMMERCIAL

## 2022-08-04 VITALS
OXYGEN SATURATION: 100 % | SYSTOLIC BLOOD PRESSURE: 124 MMHG | WEIGHT: 196 LBS | RESPIRATION RATE: 12 BRPM | HEART RATE: 57 BPM | BODY MASS INDEX: 28.94 KG/M2 | TEMPERATURE: 97.6 F | DIASTOLIC BLOOD PRESSURE: 80 MMHG

## 2022-08-04 DIAGNOSIS — R07.0 THROAT PAIN: Primary | ICD-10-CM

## 2022-08-04 LAB
DEPRECATED S PYO AG THROAT QL EIA: NEGATIVE
FLUAV AG SPEC QL IA: NEGATIVE
FLUBV AG SPEC QL IA: NEGATIVE
GROUP A STREP BY PCR: NOT DETECTED
SARS-COV-2 RNA RESP QL NAA+PROBE: NEGATIVE

## 2022-08-04 PROCEDURE — 87804 INFLUENZA ASSAY W/OPTIC: CPT | Performed by: FAMILY MEDICINE

## 2022-08-04 PROCEDURE — 99213 OFFICE O/P EST LOW 20 MIN: CPT | Mod: CS | Performed by: FAMILY MEDICINE

## 2022-08-04 PROCEDURE — U0005 INFEC AGEN DETEC AMPLI PROBE: HCPCS | Performed by: FAMILY MEDICINE

## 2022-08-04 PROCEDURE — U0003 INFECTIOUS AGENT DETECTION BY NUCLEIC ACID (DNA OR RNA); SEVERE ACUTE RESPIRATORY SYNDROME CORONAVIRUS 2 (SARS-COV-2) (CORONAVIRUS DISEASE [COVID-19]), AMPLIFIED PROBE TECHNIQUE, MAKING USE OF HIGH THROUGHPUT TECHNOLOGIES AS DESCRIBED BY CMS-2020-01-R: HCPCS | Performed by: FAMILY MEDICINE

## 2022-08-04 PROCEDURE — 87651 STREP A DNA AMP PROBE: CPT | Performed by: FAMILY MEDICINE

## 2022-08-04 NOTE — PROGRESS NOTES
SUBJECTIVE:Reuben Cano is a 37 year old male with a chief complaint of sore throat.    Onset of symptoms was day(s) ago.    Course of illness: still present.    Severity moderate    Past Medical History:   Diagnosis Date     Hyperlipidemia LDL goal <130 09/29/2019         No Known Allergies  Social History     Tobacco Use     Smoking status: Never Smoker     Smokeless tobacco: Never Used   Substance Use Topics     Alcohol use: Yes     Comment: occasionally-2x month       ROS:  SKIN: no rash  GI: no vomiting    OBJECTIVE:   /80   Pulse 57   Temp 97.6  F (36.4  C) (Tympanic)   Resp 12   Wt 88.9 kg (196 lb)   SpO2 100%   BMI 28.94 kg/m  GENERAL APPEARANCE: healthy, alert and no distress  EYES: EOMI,  PERRL, conjunctiva clear  HENT: ear canals and TM's normal.  Nose normal.  Pharynx erythematous with some exudate noted.  NECK: supple, non-tender to palpation, no adenopathy noted  SKIN: no suspicious lesions or rashes    Rapid Strep test is negative; await throat culture results.      ICD-10-CM    1. Throat pain  R07.0 Symptomatic; Yes; 8/1/2022 COVID-19 Virus (Coronavirus) by PCR Nose     Influenza A & B Antigen     Streptococcus A Rapid Screen w/Reflex to PCR     Group A Streptococcus PCR Throat Swab       Symptomatic treat with gargles, lozenges, and OTC analgesic as needed.  Follow-up with primary clinic if not improving.

## 2022-09-17 ENCOUNTER — HEALTH MAINTENANCE LETTER (OUTPATIENT)
Age: 37
End: 2022-09-17

## 2022-10-03 ENCOUNTER — OFFICE VISIT (OUTPATIENT)
Dept: INTERNAL MEDICINE | Facility: CLINIC | Age: 37
End: 2022-10-03
Payer: COMMERCIAL

## 2022-10-03 VITALS
HEIGHT: 69 IN | OXYGEN SATURATION: 100 % | HEART RATE: 65 BPM | SYSTOLIC BLOOD PRESSURE: 116 MMHG | RESPIRATION RATE: 14 BRPM | TEMPERATURE: 97.1 F | DIASTOLIC BLOOD PRESSURE: 73 MMHG | BODY MASS INDEX: 29.06 KG/M2 | WEIGHT: 196.2 LBS

## 2022-10-03 DIAGNOSIS — Z23 NEED FOR PROPHYLACTIC VACCINATION AND INOCULATION AGAINST INFLUENZA: ICD-10-CM

## 2022-10-03 DIAGNOSIS — Z11.59 NEED FOR HEPATITIS C SCREENING TEST: ICD-10-CM

## 2022-10-03 DIAGNOSIS — L98.9 SKIN LESION: Primary | ICD-10-CM

## 2022-10-03 PROCEDURE — 90686 IIV4 VACC NO PRSV 0.5 ML IM: CPT

## 2022-10-03 PROCEDURE — 90471 IMMUNIZATION ADMIN: CPT

## 2022-10-03 PROCEDURE — 99212 OFFICE O/P EST SF 10 MIN: CPT | Mod: 25

## 2022-10-03 NOTE — PROGRESS NOTES
"  Assessment & Plan     Skin lesion  On R earlobe pear sized growth. Will refer to derm for removeal  - Adult Dermatology Referral; Future    Need for prophylactic vaccination and inoculation against influenza  noted  - INFLUENZA VACCINE IM > 6 MONTHS VALENT IIV4 (AFLURIA/FLUZONE)     BMI:   Estimated body mass index is 28.97 kg/m  as calculated from the following:    Height as of this encounter: 1.753 m (5' 9\").    Weight as of this encounter: 89 kg (196 lb 3.2 oz).     Fadi Altamirano NP  [facility] SHAZIA Alexis is a 37 year old accompanied by his baby boy, presenting for the following health issues:  Keloid (Growth on right ear lobe.)      History of Present Illness       Reason for visit:  Growth on the right ear  Symptom onset:  More than a month  Symptom intensity:  Moderate  Symptom progression:  Staying the same  Had these symptoms before:  No  What makes it worse:  No  What makes it better:  No    He eats 2-3 servings of fruits and vegetables daily.He consumes 1 sweetened beverage(s) daily.He exercises with enough effort to increase his heart rate 30 to 60 minutes per day.  He exercises with enough effort to increase his heart rate 5 days per week.   He is taking medications regularly.     Growth on ear  Onset: 6 months  Location: R ear   Duration: started after piercing  Characteristics: hard pea size mass on ear lobe, it gets itchy sometimes  Radiation: n/a  Timing: all the time      Review of Systems   Constitutional, HEENT, cardiovascular, pulmonary, GI, , musculoskeletal, neuro, skin, endocrine and psych systems are negative, except as otherwise noted.      Objective    There were no vitals taken for this visit.  There is no height or weight on file to calculate BMI.  Physical Exam   GENERAL: alert and no distress  EYES: Eyes grossly normal to inspection, PERRL and conjunctivae and sclerae normal  HENT: nose and mouth without ulcers or lesions, pea size mass on R " ear lobe  NECK: no adenopathy, no asymmetry, masses, or scars and thyroid normal to palpation  RESP: lungs clear to auscultation - no rales, rhonchi or wheezes  CV: regular rate and rhythm, normal S1 S2, no S3 or S4, no murmur, click or rub, no peripheral edema and peripheral pulses strong  MS: no gross musculoskeletal defects noted, no edema  SKIN: no suspicious lesions or rashes, small hard mass on ear   NEURO: Normal strength and tone, mentation intact and speech normal  PSYCH: mentation appears normal, affect normal/bright

## 2022-10-17 ENCOUNTER — OFFICE VISIT (OUTPATIENT)
Dept: URGENT CARE | Facility: URGENT CARE | Age: 37
End: 2022-10-17
Payer: COMMERCIAL

## 2022-10-17 VITALS
RESPIRATION RATE: 16 BRPM | HEART RATE: 85 BPM | DIASTOLIC BLOOD PRESSURE: 73 MMHG | SYSTOLIC BLOOD PRESSURE: 128 MMHG | BODY MASS INDEX: 28.94 KG/M2 | OXYGEN SATURATION: 99 % | WEIGHT: 196 LBS | TEMPERATURE: 101.3 F

## 2022-10-17 DIAGNOSIS — R05.8 OTHER COUGH: Primary | ICD-10-CM

## 2022-10-17 DIAGNOSIS — J03.90 EXUDATIVE TONSILLITIS: ICD-10-CM

## 2022-10-17 DIAGNOSIS — I88.9 LYMPHADENITIS: ICD-10-CM

## 2022-10-17 LAB
BASOPHILS # BLD AUTO: 0 10E3/UL (ref 0–0.2)
BASOPHILS NFR BLD AUTO: 0 %
DEPRECATED S PYO AG THROAT QL EIA: NEGATIVE
EOSINOPHIL # BLD AUTO: 0 10E3/UL (ref 0–0.7)
EOSINOPHIL NFR BLD AUTO: 0 %
ERYTHROCYTE [DISTWIDTH] IN BLOOD BY AUTOMATED COUNT: 14.8 % (ref 10–15)
FLUAV AG SPEC QL IA: NEGATIVE
FLUBV AG SPEC QL IA: NEGATIVE
GROUP A STREP BY PCR: NOT DETECTED
HCT VFR BLD AUTO: 43.8 % (ref 40–53)
HGB BLD-MCNC: 14.2 G/DL (ref 13.3–17.7)
LYMPHOCYTES # BLD AUTO: 1 10E3/UL (ref 0.8–5.3)
LYMPHOCYTES NFR BLD AUTO: 11 %
MCH RBC QN AUTO: 25.8 PG (ref 26.5–33)
MCHC RBC AUTO-ENTMCNC: 32.4 G/DL (ref 31.5–36.5)
MCV RBC AUTO: 80 FL (ref 78–100)
MONOCYTES # BLD AUTO: 0.8 10E3/UL (ref 0–1.3)
MONOCYTES NFR BLD AUTO: 10 %
MONOCYTES NFR BLD AUTO: NEGATIVE %
NEUTROPHILS # BLD AUTO: 6.9 10E3/UL (ref 1.6–8.3)
NEUTROPHILS NFR BLD AUTO: 79 %
PLATELET # BLD AUTO: 239 10E3/UL (ref 150–450)
RBC # BLD AUTO: 5.51 10E6/UL (ref 4.4–5.9)
WBC # BLD AUTO: 8.8 10E3/UL (ref 4–11)

## 2022-10-17 PROCEDURE — 36415 COLL VENOUS BLD VENIPUNCTURE: CPT | Performed by: PHYSICIAN ASSISTANT

## 2022-10-17 PROCEDURE — 87651 STREP A DNA AMP PROBE: CPT | Performed by: PHYSICIAN ASSISTANT

## 2022-10-17 PROCEDURE — 99214 OFFICE O/P EST MOD 30 MIN: CPT | Mod: CS | Performed by: PHYSICIAN ASSISTANT

## 2022-10-17 PROCEDURE — 86308 HETEROPHILE ANTIBODY SCREEN: CPT | Performed by: PHYSICIAN ASSISTANT

## 2022-10-17 PROCEDURE — 87804 INFLUENZA ASSAY W/OPTIC: CPT

## 2022-10-17 PROCEDURE — U0005 INFEC AGEN DETEC AMPLI PROBE: HCPCS | Performed by: PHYSICIAN ASSISTANT

## 2022-10-17 PROCEDURE — U0003 INFECTIOUS AGENT DETECTION BY NUCLEIC ACID (DNA OR RNA); SEVERE ACUTE RESPIRATORY SYNDROME CORONAVIRUS 2 (SARS-COV-2) (CORONAVIRUS DISEASE [COVID-19]), AMPLIFIED PROBE TECHNIQUE, MAKING USE OF HIGH THROUGHPUT TECHNOLOGIES AS DESCRIBED BY CMS-2020-01-R: HCPCS | Performed by: PHYSICIAN ASSISTANT

## 2022-10-17 PROCEDURE — 85025 COMPLETE CBC W/AUTO DIFF WBC: CPT | Performed by: PHYSICIAN ASSISTANT

## 2022-10-17 RX ORDER — IBUPROFEN 800 MG/1
800 TABLET, FILM COATED ORAL EVERY 8 HOURS PRN
Qty: 30 TABLET | Refills: 0 | Status: SHIPPED | OUTPATIENT
Start: 2022-10-17 | End: 2023-10-17

## 2022-10-17 NOTE — PROGRESS NOTES
Assessment & Plan     Cough    Influenza neg  covid pending    - Influenza A/B antigen  - Group A Streptococcus PCR Throat Swab    Exudative tonsillitis     It's spread by coughing, kissing, sharing glasses or eating utensils, or by touching others after touching your mouth or nose. Symptoms include throat pain that is worse with swallowing, aching all over, headache, swollen lymph nodes at the front of the neck, and red swollen tonsils sometimes with white patches and fever. It's treated with antibiotic medicine. This should help you start to feel better in 1 to 2 days.     - CBC with platelets and differential; Future  - Mononucleosis screen; Future  - CBC with platelets and differential  - Mononucleosis screen  - amoxicillin-clavulanate (AUGMENTIN) 875-125 MG tablet; Take 1 tablet by mouth 2 times daily for 10 days  - magic mouthwash (ENTER INGREDIENTS IN COMMENTS) suspension; Swish and spit 5-10 mLs in mouth every 6 hours as needed 30 ml of Benadryl (12.5 mg/5 ml), 60 ml Maalox, 30 ml Viscous Lidocaine  - ibuprofen (ADVIL/MOTRIN) 800 MG tablet; Take 1 tablet (800 mg) by mouth every 8 hours as needed    Lymphadenitis    You have a bacterial infection of a lymph node. The lymph nodes are part of your immune system. They are found under the jaw and along the side of the neck, in the armpits groin, and some other parts of the body. An infection or inflammation in nearby tissues causes the lymph nodes to swell and become tender.   When a bacterial infection occurs in the lymph node, it becomes very painful. The nearby skin gets red and warm. You may also have a fever.   Antibiotics and warm compresses are used to treat this infection. The pain and redness will get better over the next 7 to 10 days. Swelling may take several months to completely go away.   Sometimes an abscess (with pus) forms inside the lymph node. If this happens, antibiotics may not be enough to cure the infection. Your healthcare provider may  "advise draining it with a needle or that minor surgery is needed to better drain the pus. You may need blood tests or a study of the pus inside the abscess to guide your treatment.     - amoxicillin-clavulanate (AUGMENTIN) 875-125 MG tablet; Take 1 tablet by mouth 2 times daily for 10 days       BMI:   Estimated body mass index is 28.94 kg/m  as calculated from the following:    Height as of 10/3/22: 1.753 m (5' 9\").    Weight as of this encounter: 88.9 kg (196 lb).     At today's visit with Reuben Cano , we discussed results, diagnosis, medications and formulated a plan.  We also discussed red flags for immediate return to clinic/ER, as well as indications for follow up with PCP if not improved in 3 days. Patient understood and agreed to plan. Reuben Cano was discharged with stable vitals and has no further questions.       No follow-ups on file.    Balta Acevedo, [facility], PA-C  [facility]    Silvia Alexis is a 37 year old, presenting for the following health issues:  Pharyngitis (Pt is having swelling in the lymph nodes on neck and a cold for the past 5 days)      HPI   Review of Systems   Constitutional, HEENT, cardiovascular, pulmonary, gi and gu systems are negative, except as otherwise noted.      Objective    /73   Pulse 85   Temp (!) 101.3  F (38.5  C) (Tympanic)   Resp 16   Wt 88.9 kg (196 lb)   SpO2 99%   BMI 28.94 kg/m    Body mass index is 28.94 kg/m .  Physical Exam   GENERAL: healthy, alert and no distress  EYES: Eyes grossly normal to inspection, PERRL and conjunctivae and sclerae normal  HENT: normal cephalic/atraumatic, ear canals and TM's normal, nose and mouth without ulcers or lesions, oropharynx clear, oral mucous membranes moist, tonsillar hypertrophy, tonsillar erythema and tonsillar exudate  NECK: cervical adenopathy anterior, no asymmetry, masses, or scars and thyroid normal to palpation  RESP: lungs clear to auscultation - no rales, rhonchi or " wheezes  CV: regular rate and rhythm, normal S1 S2, no S3 or S4, no murmur, click or rub, no peripheral edema and peripheral pulses strong  ABDOMEN: soft, nontender, no hepatosplenomegaly, no masses and bowel sounds normal  MS: no gross musculoskeletal defects noted, no edema  SKIN: no suspicious lesions or rashes  NEURO: Normal strength and tone, mentation intact and speech normal  PSYCH: mentation appears normal, affect normal/bright        Results for orders placed or performed in visit on 10/17/22   Mononucleosis screen     Status: Normal   Result Value Ref Range    Mononucleosis Screen Negative Negative   Streptococcus A Rapid Screen w/Reflex to PCR     Status: Normal    Specimen: Throat; Swab   Result Value Ref Range    Group A Strep antigen Negative Negative   Influenza A/B antigen     Status: Normal    Specimen: Nose; Swab   Result Value Ref Range    Influenza A antigen Negative Negative    Influenza B antigen Negative Negative    Narrative    Test results must be correlated with clinical data. If necessary, results should be confirmed by a molecular assay or viral culture.   CBC with platelets and differential     Status: None (In process)    Narrative    The following orders were created for panel order CBC with platelets and differential.  Procedure                               Abnormality         Status                     ---------                               -----------         ------                     CBC with platelets and d...[[id number]]                      In process                   Please view results for these tests on the individual orders.

## 2022-10-18 LAB — SARS-COV-2 RNA RESP QL NAA+PROBE: NEGATIVE

## 2023-01-23 ENCOUNTER — HEALTH MAINTENANCE LETTER (OUTPATIENT)
Age: 38
End: 2023-01-23

## 2023-03-30 ENCOUNTER — OFFICE VISIT (OUTPATIENT)
Dept: DERMATOLOGY | Facility: CLINIC | Age: 38
End: 2023-03-30
Payer: OTHER GOVERNMENT

## 2023-03-30 DIAGNOSIS — L98.9 SKIN LESION: ICD-10-CM

## 2023-03-30 DIAGNOSIS — L91.0 KELOID: Primary | ICD-10-CM

## 2023-03-30 PROCEDURE — 11442 EXC FACE-MM B9+MARG 1.1-2 CM: CPT | Performed by: DERMATOLOGY

## 2023-03-30 PROCEDURE — 99203 OFFICE O/P NEW LOW 30 MIN: CPT | Mod: 25 | Performed by: DERMATOLOGY

## 2023-03-30 PROCEDURE — 88305 TISSUE EXAM BY PATHOLOGIST: CPT | Performed by: PATHOLOGY

## 2023-03-30 NOTE — LETTER
3/30/2023         RE: Reuben Cano  [address]  [place] [address]        Dear Colleague,    Thank you for referring your patient, Reuben Cano, to the [facility]. Please see a copy of my visit note below.    Reuben Cano , a 38 year old year old male patient, I was asked to see by DUSTIN Altamirano for lesion on right earlobe.  Patient states this has been present for a while.  Patient reports the following symptoms:  tender .  Patient reports the following previous treatments none.  Patient reports the following modifying factors none.  Associated symptoms: none.  Patient has no other skin complaints today.  Remainder of the HPI, Meds, PMH, Allergies, FH, and SH was reviewed in chart.      Past Medical History:   Diagnosis Date     Hyperlipidemia LDL goal <130 09/29/2019           Past Surgical History:   Procedure Laterality Date     NO HISTORY OF SURGERY          Family History   Problem Relation Age of Onset     No Known Problems Mother      No Known Problems Father      No Known Problems Sister      No Known Problems Sister      No Known Problems Sister      Coronary Artery Disease Early Onset No family hx of      Colon Cancer No family hx of        Social History     Socioeconomic History     Marital status: Single     Spouse name: Not on file     Number of children: Not on file     Years of education: Not on file     Highest education level: Not on file   Occupational History     Not on file   Tobacco Use     Smoking status: Never     Smokeless tobacco: Never   Vaping Use     Vaping Use: Never used   Substance and Sexual Activity     Alcohol use: Yes     Comment: occasionally-2x month     Drug use: Never     Sexual activity: Yes     Partners: Female   Other Topics Concern     Not on file   Social History Narrative     Not on file     Social Determinants of Health     Financial Resource Strain: Not on file   Food Insecurity: Not on file   Transportation Needs: Not  on file   Physical Activity: Not on file   Stress: Not on file   Social Connections: Not on file   Intimate Partner Violence: Not on file   Housing Stability: Not on file       Outpatient Encounter Medications as of 3/30/2023   Medication Sig Dispense Refill     SUMAtriptan (IMITREX) 50 MG tablet Take 1-2 tablets ( mg) by mouth at onset of headache for migraine May repeat another dose every 3 hours up to max of 4 tablets/24 hours. 18 tablet 3     ibuprofen (ADVIL/MOTRIN) 800 MG tablet Take 1 tablet (800 mg) by mouth every 8 hours as needed (Patient not taking: Reported on 3/30/2023) 30 tablet 0     magic mouthwash (ENTER INGREDIENTS IN COMMENTS) suspension Swish and spit 5-10 mLs in mouth every 6 hours as needed 30 ml of Benadryl (12.5 mg/5 ml), 60 ml Maalox, 30 ml Viscous Lidocaine (Patient not taking: Reported on 3/30/2023) 120 mL 0     topiramate (TOPAMAX) 50 MG tablet Take 1.5 tablets (75 mg) by mouth daily for 14 days, THEN 2 tablets (100 mg) daily for 14 days. 180 tablet 3     No facility-administered encounter medications on file as of 3/30/2023.             Review Of Systems  Skin: As above  Eyes: negative  Ears/Nose/Throat: negative  Respiratory: No shortness of breath, dyspnea on exertion, cough, or hemoptysis  Cardiovascular: negative  Gastrointestinal: negative  Genitourinary: negative  Musculoskeletal: negative  Neurologic: negative  Psychiatric: negative  Hematologic/Lymphatic/Immunologic: negative  Endocrine: negative      O:   NAD, WDWN, Alert & Oriented, Mood & Affect wnl, Vitals stable   Here today alone   General appearance girish v   Vitals stable   Alert, oriented and in no acute distress   R earlobe skin colored 1.5cm plaque       Eyes: Conjunctivae/lids:Normal     ENT: Lips, buccal mucosa, tongue: normal    MSK:Normal    Cardiovascular: peripheral edema none    Pulm: Breathing Normal    Neuro/Psych: Orientation:Normal; Mood/Affect:Normal      A/P:  1. Keloid  Excision and recurrence  discussed with patient   Il tac discussed with patient he would like removed  It was a pleasure speaking to Reuben aCno today.  Previous clinic  notes and pertinent laboratory tests were reviewed prior to Reuben Cano's visit.  Nature of benign skin lesions dicussed with patient.    PROCEDURE NOTE  R earlobe  EXCISION OF BENIGN LESION : After thorough discussion of PGACAC, consent obtained, anesthesia and prep, the margins of the lesion were identified and an incision was made encompassing the lesion. The incisions were made through the skin and down to and including the subcutaneous tissue. The lesion was removed en bloc and submitted for perm  pathologic review. hemostasis was achieved.   REPAIR SECOND INTENT: We discussed the options for wound management in full with the patient including risks/benefits/possible outcomes. Decision made to allow the wound to heal by second intention. Cautery was used for for hemostasis. EBL minimal; complications none; wound care routine.  The patient was discharged in good condition and will return in one month or prn for wound evaluation.    Return to clinic 4 weeks for il tac        Again, thank you for allowing me to participate in the care of your patient.        Sincerely,        Wale Velez MD

## 2023-03-30 NOTE — PROGRESS NOTES
Reuben Cano , a 38 year old year old male patient, I was asked to see by DUSTIN Altamirano for lesion on right earlobe.  Patient states this has been present for a while.  Patient reports the following symptoms:  tender .  Patient reports the following previous treatments none.  Patient reports the following modifying factors none.  Associated symptoms: none.  Patient has no other skin complaints today.  Remainder of the HPI, Meds, PMH, Allergies, FH, and SH was reviewed in chart.      Past Medical History:   Diagnosis Date     Hyperlipidemia LDL goal <130 09/29/2019           Past Surgical History:   Procedure Laterality Date     NO HISTORY OF SURGERY          Family History   Problem Relation Age of Onset     No Known Problems Mother      No Known Problems Father      No Known Problems Sister      No Known Problems Sister      No Known Problems Sister      Coronary Artery Disease Early Onset No family hx of      Colon Cancer No family hx of        Social History     Socioeconomic History     Marital status: Single     Spouse name: Not on file     Number of children: Not on file     Years of education: Not on file     Highest education level: Not on file   Occupational History     Not on file   Tobacco Use     Smoking status: Never     Smokeless tobacco: Never   Vaping Use     Vaping Use: Never used   Substance and Sexual Activity     Alcohol use: Yes     Comment: occasionally-2x month     Drug use: Never     Sexual activity: Yes     Partners: Female   Other Topics Concern     Not on file   Social History Narrative     Not on file     Social Determinants of Health     Financial Resource Strain: Not on file   Food Insecurity: Not on file   Transportation Needs: Not on file   Physical Activity: Not on file   Stress: Not on file   Social Connections: Not on file   Intimate Partner Violence: Not on file   Housing Stability: Not on file       Outpatient Encounter Medications as of 3/30/2023   Medication Sig Dispense  Refill     SUMAtriptan (IMITREX) 50 MG tablet Take 1-2 tablets ( mg) by mouth at onset of headache for migraine May repeat another dose every 3 hours up to max of 4 tablets/24 hours. 18 tablet 3     ibuprofen (ADVIL/MOTRIN) 800 MG tablet Take 1 tablet (800 mg) by mouth every 8 hours as needed (Patient not taking: Reported on 3/30/2023) 30 tablet 0     magic mouthwash (ENTER INGREDIENTS IN COMMENTS) suspension Swish and spit 5-10 mLs in mouth every 6 hours as needed 30 ml of Benadryl (12.5 mg/5 ml), 60 ml Maalox, 30 ml Viscous Lidocaine (Patient not taking: Reported on 3/30/2023) 120 mL 0     topiramate (TOPAMAX) 50 MG tablet Take 1.5 tablets (75 mg) by mouth daily for 14 days, THEN 2 tablets (100 mg) daily for 14 days. 180 tablet 3     No facility-administered encounter medications on file as of 3/30/2023.             Review Of Systems  Skin: As above  Eyes: negative  Ears/Nose/Throat: negative  Respiratory: No shortness of breath, dyspnea on exertion, cough, or hemoptysis  Cardiovascular: negative  Gastrointestinal: negative  Genitourinary: negative  Musculoskeletal: negative  Neurologic: negative  Psychiatric: negative  Hematologic/Lymphatic/Immunologic: negative  Endocrine: negative      O:   NAD, WDWN, Alert & Oriented, Mood & Affect wnl, Vitals stable   Here today alone   General appearance girish v   Vitals stable   Alert, oriented and in no acute distress   R earlobe skin colored 1.5cm plaque       Eyes: Conjunctivae/lids:Normal     ENT: Lips, buccal mucosa, tongue: normal    MSK:Normal    Cardiovascular: peripheral edema none    Pulm: Breathing Normal    Neuro/Psych: Orientation:Normal; Mood/Affect:Normal      A/P:  1. Keloid  Excision and recurrence discussed with patient   Il tac discussed with patient he would like removed  It was a pleasure speaking to Reuben Cano today.  Previous clinic  notes and pertinent laboratory tests were reviewed prior to Reuben Cano's visit.  Nature of benign skin  lesions dicussed with patient.    PROCEDURE NOTE  R earlobe  EXCISION OF BENIGN LESION : After thorough discussion of PGACAC, consent obtained, anesthesia and prep, the margins of the lesion were identified and an incision was made encompassing the lesion. The incisions were made through the skin and down to and including the subcutaneous tissue. The lesion was removed en bloc and submitted for perm  pathologic review. hemostasis was achieved.   REPAIR SECOND INTENT: We discussed the options for wound management in full with the patient including risks/benefits/possible outcomes. Decision made to allow the wound to heal by second intention. Cautery was used for for hemostasis. EBL minimal; complications none; wound care routine.  The patient was discharged in good condition and will return in one month or prn for wound evaluation.    Return to clinic 4 weeks for il tac

## 2023-04-03 LAB
PATH REPORT.COMMENTS IMP SPEC: NORMAL
PATH REPORT.COMMENTS IMP SPEC: NORMAL
PATH REPORT.FINAL DX SPEC: NORMAL
PATH REPORT.GROSS SPEC: NORMAL
PATH REPORT.MICROSCOPIC SPEC OTHER STN: NORMAL
PATH REPORT.RELEVANT HX SPEC: NORMAL

## 2023-07-14 ENCOUNTER — OFFICE VISIT (OUTPATIENT)
Dept: URGENT CARE | Facility: URGENT CARE | Age: 38
End: 2023-07-14
Payer: OTHER GOVERNMENT

## 2023-07-14 VITALS
TEMPERATURE: 97.8 F | HEART RATE: 64 BPM | SYSTOLIC BLOOD PRESSURE: 123 MMHG | DIASTOLIC BLOOD PRESSURE: 86 MMHG | OXYGEN SATURATION: 100 % | WEIGHT: 205.2 LBS | BODY MASS INDEX: 30.3 KG/M2

## 2023-07-14 DIAGNOSIS — M54.50 BILATERAL LOW BACK PAIN WITHOUT SCIATICA, UNSPECIFIED CHRONICITY: Primary | ICD-10-CM

## 2023-07-14 PROCEDURE — 99213 OFFICE O/P EST LOW 20 MIN: CPT | Performed by: FAMILY MEDICINE

## 2023-07-14 NOTE — LETTER
July 14, 2023      Reuben Cano  [address]  [place] [address]        To Whom It May Concern:    Reuben Cano  was seen on 7/14/23.  Please excuse him from work today due to back pain. Please allow him to life no more than 10# for the next week and follow up if symptoms persist.        Sincerely,        Marija Byrd MD

## 2023-07-14 NOTE — PROGRESS NOTES
Chief Complaint   Patient presents with     Tailbone Pain     Pt presents with lower back pain. Went to gym which could be a possibility.Onset today.   Reuben was seen today for tailbone pain.    Diagnoses and all orders for this visit:    Bilateral low back pain without sciatica, unspecified chronicity      :d/d  myofascial low back strain, lumbosacral strain, possible herniated disc  and acute sciatica    PLAN:1) PLEASE SEE ORDER SUMMARY  Education Documentation  No documentation found.  Education Comments  No comments found.    :      1.  Continue stretching and strengthening exercises.   Hand outs provided for back exercises       2.  Continue prn heat or ice application.  Follow up if  symptoms fail to improve or worsens   Pt understood and agreed with plan       SUBJECTIVE  HPI: Reuben Cano is a 38 year old male who presents for evaluation of back pain  Symptoms began 1 day(s) ago, have been onset acute and are better.  Pain is located in the low back bilateral region, with radiation to does not radiate, Recent injury:recent heavy lifting  Personal hx of back pain is no prior back problems.  Pain is exacerbated by: lying and changing position.  Pain is relieved by: rest[unfilled] sx include: none.  Red flag symptoms: negative.    Past Medical History:   Diagnosis Date     Hyperlipidemia LDL goal <130 09/29/2019         Current Outpatient Medications   Medication Sig Dispense Refill     SUMAtriptan (IMITREX) 50 MG tablet Take 1-2 tablets ( mg) by mouth at onset of headache for migraine May repeat another dose every 3 hours up to max of 4 tablets/24 hours. 18 tablet 3     ibuprofen (ADVIL/MOTRIN) 800 MG tablet Take 1 tablet (800 mg) by mouth every 8 hours as needed (Patient not taking: Reported on 3/30/2023) 30 tablet 0     magic mouthwash (ENTER INGREDIENTS IN COMMENTS) suspension Swish and spit 5-10 mLs in mouth every 6 hours as needed 30 ml of Benadryl (12.5 mg/5 ml), 60 ml Maalox, 30 ml  Viscous Lidocaine (Patient not taking: Reported on 3/30/2023) 120 mL 0     topiramate (TOPAMAX) 50 MG tablet Take 1.5 tablets (75 mg) by mouth daily for 14 days, THEN 2 tablets (100 mg) daily for 14 days. 180 tablet 3     Social History     Tobacco Use     Smoking status: Never     Smokeless tobacco: Never   Substance Use Topics     Alcohol use: Yes     Comment: occasionally-2x month       ROS:  Review of systems negative except as stated above.    OBJECTIVE:  /86 (BP Location: Left arm, Patient Position: Sitting, Cuff Size: Adult Regular)   Pulse 64   Temp 97.8  F (36.6  C) (Oral)   Wt 93.1 kg (205 lb 3.2 oz)   SpO2 100%   BMI 30.30 kg/m    Back examination: Back symmetric, no curvature. ROM normal. No CVA tenderness.  [unfilled] leg raise test: positive  Lower back - there is no spinal tenderness   GENERAL APPEARANCE: healthy, alert and no distress  PSYCH: mentation appears normal    Marija Byrd MD

## 2024-02-24 ENCOUNTER — HEALTH MAINTENANCE LETTER (OUTPATIENT)
Age: 39
End: 2024-02-24

## 2025-03-09 ENCOUNTER — HEALTH MAINTENANCE LETTER (OUTPATIENT)
Age: 40
End: 2025-03-09